# Patient Record
Sex: MALE | Race: WHITE | NOT HISPANIC OR LATINO | Employment: OTHER | URBAN - METROPOLITAN AREA
[De-identification: names, ages, dates, MRNs, and addresses within clinical notes are randomized per-mention and may not be internally consistent; named-entity substitution may affect disease eponyms.]

---

## 2018-08-08 ENCOUNTER — HOSPITAL ENCOUNTER (EMERGENCY)
Facility: HOSPITAL | Age: 53
Discharge: LEFT BEFORE DISCHARGE | End: 2018-08-08
Attending: EMERGENCY MEDICINE

## 2018-08-08 ENCOUNTER — APPOINTMENT (EMERGENCY)
Dept: RADIOLOGY | Facility: HOSPITAL | Age: 53
End: 2018-08-08

## 2018-08-08 VITALS
HEART RATE: 77 BPM | OXYGEN SATURATION: 97 % | DIASTOLIC BLOOD PRESSURE: 73 MMHG | WEIGHT: 168.21 LBS | TEMPERATURE: 97.7 F | SYSTOLIC BLOOD PRESSURE: 113 MMHG | RESPIRATION RATE: 16 BRPM

## 2018-08-08 DIAGNOSIS — R06.02 SOB (SHORTNESS OF BREATH): Primary | ICD-10-CM

## 2018-08-08 LAB
ALBUMIN SERPL BCP-MCNC: 4 G/DL (ref 3–5.2)
ALP SERPL-CCNC: 57 U/L (ref 43–122)
ALT SERPL W P-5'-P-CCNC: 45 U/L (ref 9–52)
ANION GAP SERPL CALCULATED.3IONS-SCNC: 9 MMOL/L (ref 5–14)
AST SERPL W P-5'-P-CCNC: 30 U/L (ref 17–59)
BACTERIA UR QL AUTO: ABNORMAL /HPF
BASOPHILS # BLD AUTO: 0.1 THOUSANDS/ΜL (ref 0–0.1)
BASOPHILS NFR BLD AUTO: 1 % (ref 0–1)
BILIRUB SERPL-MCNC: 0.5 MG/DL
BILIRUB UR QL STRIP: NEGATIVE
BUN SERPL-MCNC: 17 MG/DL (ref 5–25)
CALCIUM SERPL-MCNC: 9.2 MG/DL (ref 8.4–10.2)
CHLORIDE SERPL-SCNC: 104 MMOL/L (ref 97–108)
CLARITY UR: CLEAR
CO2 SERPL-SCNC: 25 MMOL/L (ref 22–30)
COLOR UR: YELLOW
CREAT SERPL-MCNC: 0.93 MG/DL (ref 0.7–1.5)
D-DIMER: <0.19 MG/L
EOSINOPHIL # BLD AUTO: 0.4 THOUSAND/ΜL (ref 0–0.4)
EOSINOPHIL NFR BLD AUTO: 4 % (ref 0–6)
ERYTHROCYTE [DISTWIDTH] IN BLOOD BY AUTOMATED COUNT: 13.6 %
GFR SERPL CREATININE-BSD FRML MDRD: 92 ML/MIN/1.73SQ M
GLUCOSE SERPL-MCNC: 110 MG/DL (ref 70–99)
GLUCOSE UR STRIP-MCNC: NEGATIVE MG/DL
HCT VFR BLD AUTO: 48.2 % (ref 41–53)
HGB BLD-MCNC: 16.8 G/DL (ref 13.5–17.5)
HGB UR QL STRIP.AUTO: 25
KETONES UR STRIP-MCNC: NEGATIVE MG/DL
LACTATE SERPL-SCNC: 0.8 MMOL/L (ref 0.7–2)
LEUKOCYTE ESTERASE UR QL STRIP: 500
LYMPHOCYTES # BLD AUTO: 2.3 THOUSANDS/ΜL (ref 0.5–4)
LYMPHOCYTES NFR BLD AUTO: 28 % (ref 20–50)
MAGNESIUM SERPL-MCNC: 2.1 MG/DL (ref 1.6–2.3)
MCH RBC QN AUTO: 28.8 PG (ref 26–34)
MCHC RBC AUTO-ENTMCNC: 34.9 G/DL (ref 31–36)
MCV RBC AUTO: 82 FL (ref 80–100)
MONOCYTES # BLD AUTO: 0.9 THOUSAND/ΜL (ref 0.2–0.9)
MONOCYTES NFR BLD AUTO: 11 % (ref 1–10)
MUCOUS THREADS UR QL AUTO: ABNORMAL
NEUTROPHILS # BLD AUTO: 4.6 THOUSANDS/ΜL (ref 1.8–7.8)
NEUTS SEG NFR BLD AUTO: 56 % (ref 45–65)
NITRITE UR QL STRIP: NEGATIVE
NON-SQ EPI CELLS URNS QL MICRO: ABNORMAL /HPF
PH UR STRIP.AUTO: 5 [PH] (ref 4.5–8)
PHOSPHATE SERPL-MCNC: 4.3 MG/DL (ref 2.5–4.8)
PLATELET # BLD AUTO: 176 THOUSANDS/UL (ref 150–450)
PMV BLD AUTO: 9.7 FL (ref 8.9–12.7)
POTASSIUM SERPL-SCNC: 4.4 MMOL/L (ref 3.6–5)
PROT SERPL-MCNC: 7.6 G/DL (ref 5.9–8.4)
PROT UR STRIP-MCNC: NEGATIVE MG/DL
RBC # BLD AUTO: 5.85 MILLION/UL (ref 4.5–5.9)
RBC #/AREA URNS AUTO: ABNORMAL /HPF
SODIUM SERPL-SCNC: 138 MMOL/L (ref 137–147)
SP GR UR STRIP.AUTO: 1.02 (ref 1–1.04)
TROPONIN I SERPL-MCNC: <0.01 NG/ML (ref 0–0.03)
UROBILINOGEN UA: NEGATIVE MG/DL
WBC # BLD AUTO: 8.3 THOUSAND/UL (ref 4.5–11)
WBC #/AREA URNS AUTO: ABNORMAL /HPF

## 2018-08-08 PROCEDURE — 80053 COMPREHEN METABOLIC PANEL: CPT | Performed by: EMERGENCY MEDICINE

## 2018-08-08 PROCEDURE — 71046 X-RAY EXAM CHEST 2 VIEWS: CPT

## 2018-08-08 PROCEDURE — 96360 HYDRATION IV INFUSION INIT: CPT

## 2018-08-08 PROCEDURE — 36415 COLL VENOUS BLD VENIPUNCTURE: CPT | Performed by: EMERGENCY MEDICINE

## 2018-08-08 PROCEDURE — 99285 EMERGENCY DEPT VISIT HI MDM: CPT

## 2018-08-08 PROCEDURE — 83605 ASSAY OF LACTIC ACID: CPT | Performed by: EMERGENCY MEDICINE

## 2018-08-08 PROCEDURE — 84100 ASSAY OF PHOSPHORUS: CPT | Performed by: EMERGENCY MEDICINE

## 2018-08-08 PROCEDURE — 85379 FIBRIN DEGRADATION QUANT: CPT | Performed by: EMERGENCY MEDICINE

## 2018-08-08 PROCEDURE — 93005 ELECTROCARDIOGRAM TRACING: CPT

## 2018-08-08 PROCEDURE — 85025 COMPLETE CBC W/AUTO DIFF WBC: CPT | Performed by: EMERGENCY MEDICINE

## 2018-08-08 PROCEDURE — 81001 URINALYSIS AUTO W/SCOPE: CPT | Performed by: EMERGENCY MEDICINE

## 2018-08-08 PROCEDURE — 84484 ASSAY OF TROPONIN QUANT: CPT | Performed by: EMERGENCY MEDICINE

## 2018-08-08 PROCEDURE — 83735 ASSAY OF MAGNESIUM: CPT | Performed by: EMERGENCY MEDICINE

## 2018-08-08 PROCEDURE — 94640 AIRWAY INHALATION TREATMENT: CPT

## 2018-08-08 RX ORDER — IPRATROPIUM BROMIDE AND ALBUTEROL SULFATE 2.5; .5 MG/3ML; MG/3ML
3 SOLUTION RESPIRATORY (INHALATION) ONCE
Status: COMPLETED | OUTPATIENT
Start: 2018-08-08 | End: 2018-08-08

## 2018-08-08 RX ORDER — IPRATROPIUM BROMIDE AND ALBUTEROL SULFATE 2.5; .5 MG/3ML; MG/3ML
SOLUTION RESPIRATORY (INHALATION)
Status: COMPLETED
Start: 2018-08-08 | End: 2018-08-08

## 2018-08-08 RX ADMIN — SODIUM CHLORIDE 1000 ML: 9 INJECTION, SOLUTION INTRAVENOUS at 18:32

## 2018-08-08 RX ADMIN — IPRATROPIUM BROMIDE AND ALBUTEROL SULFATE 3 ML: 2.5; .5 SOLUTION RESPIRATORY (INHALATION) at 18:44

## 2018-08-08 RX ADMIN — IPRATROPIUM BROMIDE AND ALBUTEROL SULFATE 3 ML: .5; 3 SOLUTION RESPIRATORY (INHALATION) at 18:44

## 2018-08-08 NOTE — ED PROVIDER NOTES
History  Chief Complaint   Patient presents with    Shortness of Breath     pt states that he has felt SOB, had upper back pain, and chest pain when he moves around more than normal  he also has a headache     Patient is a 75-year-old male with a history of hypertension coming in today with progressive shortness of breath and dyspnea on exertion  Patient states he was recently treated with Levaquin by his family doctor for URI  This was approximately 2 weeks ago when his symptoms were productive cough with fevers and chills  Subsequently this has resolved after full 5 days  He states he has a dry cough now  He has no fevers, chills, hemoptysis, lower extremity edema or chest pain or pressure  He does feel more short of breath with walking short distances as well as stairs  He does get a little diaphoretic at times  He has no nauseousness, vomiting  He has no recent travel, recent surgeries  He did not take anything for this  Patient was concerned because I am a smoker and I wanted make sure my lungs are okay        History provided by:  Patient   used: No    Shortness of Breath   Severity:  Moderate  Onset quality:  Gradual  Duration:  2 weeks  Timing:  Constant  Progression:  Unchanged  Chronicity:  New  Context: URI    Context: not activity, not animal exposure, not emotional upset, not fumes, not known allergens, not occupational exposure, not pollens, not smoke exposure, not strong odors and not weather changes    Relieved by:  Nothing  Worsened by:  Exertion, movement and coughing  Ineffective treatments: abx    Associated symptoms: cough    Associated symptoms: no abdominal pain, no chest pain, no claudication, no diaphoresis, no ear pain, no fever, no headaches, no hemoptysis, no neck pain, no PND, no rash, no sore throat, no sputum production, no syncope, no swollen glands, no vomiting and no wheezing    Risk factors: tobacco use    Risk factors: no recent alcohol use, no family hx of DVT, no hx of cancer, no hx of PE/DVT, no obesity, no prolonged immobilization and no recent surgery        None       Past Medical History:   Diagnosis Date    Hypertension        History reviewed  No pertinent surgical history  History reviewed  No pertinent family history  I have reviewed and agree with the history as documented  Social History   Substance Use Topics    Smoking status: Current Every Day Smoker     Types: Cigars    Smokeless tobacco: Never Used      Comment: smokes 2-3 cigars daily    Alcohol use No        Review of Systems   Constitutional: Negative for diaphoresis and fever  HENT: Negative for ear pain and sore throat  Eyes: Negative for visual disturbance  Respiratory: Positive for cough and shortness of breath  Negative for hemoptysis, sputum production, chest tightness and wheezing  +HUGGINS     Cardiovascular: Negative for chest pain, palpitations, claudication, syncope and PND  Gastrointestinal: Negative for abdominal pain, nausea and vomiting  Genitourinary: Negative for difficulty urinating and dysuria  Musculoskeletal: Negative for back pain and neck pain  Skin: Negative for rash  Neurological: Negative for weakness and headaches  Psychiatric/Behavioral: Negative for confusion  All other systems reviewed and are negative  Physical Exam  Physical Exam   Constitutional: He is oriented to person, place, and time  He appears well-developed and well-nourished  No distress  HENT:   Head: Normocephalic and atraumatic  Mouth/Throat: Oropharynx is clear and moist    Patient is maintaining airway maintaining secretions  Uvula midline without any edema  Eyes: Conjunctivae and EOM are normal  Pupils are equal, round, and reactive to light  Neck: Normal range of motion  Neck supple  Cardiovascular: Normal rate, regular rhythm, normal heart sounds and intact distal pulses  No murmur heard    Pulmonary/Chest: Effort normal and breath sounds normal  No stridor  No respiratory distress  He exhibits no tenderness  No conversational dyspnea  Abdominal: Soft  Bowel sounds are normal  He exhibits no distension  There is no tenderness  Musculoskeletal: Normal range of motion  He exhibits no edema  Neurological: He is alert and oriented to person, place, and time  No cranial nerve deficit  Skin: Skin is warm  Capillary refill takes less than 2 seconds  He is not diaphoretic  Psychiatric: He has a normal mood and affect  His behavior is normal  Judgment and thought content normal    Nursing note and vitals reviewed  Vital Signs  ED Triage Vitals   Temperature Pulse Respirations Blood Pressure SpO2   08/08/18 1644 08/08/18 1644 08/08/18 1717 08/08/18 1644 08/08/18 1644   97 7 °F (36 5 °C) 87 18 107/72 93 %      Temp Source Heart Rate Source Patient Position - Orthostatic VS BP Location FiO2 (%)   08/08/18 1644 08/08/18 1841 08/08/18 1644 08/08/18 1644 --   Temporal Monitor Sitting Left arm       Pain Score       08/08/18 1841       No Pain           Vitals:    08/08/18 1644 08/08/18 1841   BP: 107/72 113/73   Pulse: 87 77   Patient Position - Orthostatic VS: Sitting Lying       Visual Acuity      ED Medications  Medications   sodium chloride 0 9 % bolus 1,000 mL (0 mL Intravenous Stopped 8/8/18 1936)   ipratropium-albuterol (DUO-NEB) 0 5-2 5 mg/3 mL inhalation solution 3 mL (3 mL Nebulization Given 8/8/18 1844)       Diagnostic Studies  Results Reviewed     Procedure Component Value Units Date/Time    Lactic acid, plasma [[id number]]  (Normal) Collected:  08/08/18 1832    Lab Status:  Final result Specimen:  Blood from Arm, Right Updated:  08/08/18 1959     LACTIC ACID 0 8 mmol/L     Narrative:         Result may be elevated if tourniquet was used during collection      Troponin I [[id number]]  (Normal) Collected:  08/08/18 1832    Lab Status:  Final result Specimen:  Blood from Arm, Right Updated:  08/08/18 1958     Troponin I <0 01 ng/mL D-Dimer [[id number]]  (Normal) Collected:  08/08/18 1833    Lab Status:  Final result Specimen:  Blood from Arm, Right Updated:  08/08/18 1941     D-DIMER <0 19 mg/L     Narrative:       D-DIMER:      CBC and differential [[id number]]  (Abnormal) Collected:  08/08/18 1832    Lab Status:  Final result Specimen:  Blood from Arm, Right Updated:  08/08/18 1921     WBC 8 30 Thousand/uL      RBC 5 85 Million/uL      Hemoglobin 16 8 g/dL      Hematocrit 48 2 %      MCV 82 fL      MCH 28 8 pg      MCHC 34 9 g/dL      RDW 13 6 %      MPV 9 7 fL      Platelets 015 Thousands/uL      Neutrophils Relative 56 %      Lymphocytes Relative 28 %      Monocytes Relative 11 (H) %      Eosinophils Relative 4 %      Basophils Relative 1 %      Neutrophils Absolute 4 60 Thousands/µL      Lymphocytes Absolute 2 30 Thousands/µL      Monocytes Absolute 0 90 Thousand/µL      Eosinophils Absolute 0 40 Thousand/µL      Basophils Absolute 0 10 Thousands/µL     UA w Reflex to Microscopic [[id number]]  (Abnormal) Collected:  08/08/18 1835    Lab Status:  Final result Specimen:  Urine from Urine, Clean Catch Updated:  08/08/18 1921     Color, UA Yellow     Clarity, UA Clear     Specific Gravity, UA 1 025     pH, UA 5 0     Leukocytes,  0 (A)     Nitrite, UA Negative     Protein, UA Negative mg/dl      Glucose, UA Negative mg/dl      Ketones, UA Negative mg/dl      Bilirubin, UA Negative     Blood, UA 25 0 (A)     UROBILINOGEN UA Negative mg/dL     Comprehensive metabolic panel [[id number]]  (Abnormal) Collected:  08/08/18 1832    Lab Status:  Final result Specimen:  Blood from Arm, Right Updated:  08/08/18 1901     Sodium 138 mmol/L      Potassium 4 4 mmol/L      Chloride 104 mmol/L      CO2 25 mmol/L      Anion Gap 9 mmol/L      BUN 17 mg/dL      Creatinine 0 93 mg/dL      Glucose 110 (H) mg/dL      Calcium 9 2 mg/dL      AST 30 U/L      ALT 45 U/L      Alkaline Phosphatase 57 U/L      Total Protein 7 6 g/dL      Albumin 4 0 g/dL      Total Bilirubin 0 50 mg/dL      eGFR 92 ml/min/1 73sq m     Narrative:       Hemolysis  National Kidney Disease Education Program recommendations are as follows:  GFR calculation is accurate only with a steady state creatinine  Chronic Kidney disease less than 60 ml/min/1 73 sq  meters  Kidney failure less than 15 ml/min/1 73 sq  meters  Phosphorus [[id number]]  (Normal) Collected:  08/08/18 1832    Lab Status:  Final result Specimen:  Blood from Arm, Right Updated:  08/08/18 1901     Phosphorus 4 3 mg/dL     Narrative:       Hemolysis    Magnesium [[id number]]  (Normal) Collected:  08/08/18 1832    Lab Status:  Final result Specimen:  Blood from Arm, Right Updated:  08/08/18 1901     Magnesium 2 1 mg/dL     Narrative:       Hemolysis    Urine Microscopic [[id number]] Collected:  08/08/18 1835    Lab Status: In process Specimen:  Urine from Urine, Clean Catch Updated:  08/08/18 1858                 XR chest 2 views   Final Result by Nataliia Grewal MD (08/08 1948)      No acute cardiopulmonary disease              Workstation performed: [id number]                    Procedures  Procedures       Phone Contacts  ED Phone Contact    ED Course         HEART Risk Score      Most Recent Value   History  0 Filed at: 08/08/2018 2017   ECG  1 Filed at: 08/08/2018 2017   Age  1 Filed at: 08/08/2018 2017   Risk Factors  1 Filed at: 08/08/2018 2017   Troponin  0 Filed at: 08/08/2018 2017   Heart Score Risk Calculator   History  0 Filed at: 08/08/2018 2017   ECG  1 Filed at: 08/08/2018 2017   Age  1 Filed at: 08/08/2018 2017   Risk Factors  1 Filed at: 08/08/2018 2017   Troponin  0 Filed at: 08/08/2018 2017   HEART Score  3 Filed at: 08/08/2018 2017   HEART Score  3 Filed at: 08/08/2018 2017            PERC Rule for PE      Most Recent Value   PERC Rule for PE   Age >=50  1 Filed at: 08/08/2018 1721   HR >=100  0 Filed at: 08/08/2018 1721   O2 Sat on room air < 95%  0 Filed at: 08/08/2018 1721   History of PE or DVT  0 Filed at: 08/08/2018 1721   Recent trauma or surgery  0 Filed at: 08/08/2018 1721   Hemoptysis  0 Filed at: 08/08/2018 1721   Exogenous estrogen  0 Filed at: 08/08/2018 1721   Unilateral leg swelling  0 Filed at: 08/08/2018 1721   PERC Rule for PE Results  1 Filed at: 08/08/2018 1721                Nate' Criteria for PE      Most Recent Value   Wells' Criteria for PE   Clinical signs and symptoms of DVT  0 Filed at: 08/08/2018 1721   PE is primary diagnosis or equally likely  0 Filed at: 08/08/2018 1721   HR >100  0 Filed at: 08/08/2018 1721   Immobilization at least 3 days or Surgery in the previous 4 weeks  0 Filed at: 08/08/2018 1721   Previous, objectively diagnosed PE or DVT  0 Filed at: 08/08/2018 1721   Hemoptysis  0 Filed at: 08/08/2018 1721   Malignancy with treatment within 6 months or palliative  0 Filed at: 08/08/2018 1721   Wells' Criteria Total  0 Filed at: 08/08/2018 1721            MDM  Number of Diagnoses or Management Options  SOB (shortness of breath):   Diagnosis management comments:     Patient is a 27-year-old male nontoxic appearing in no respiratory distress  Given patient's age and symptoms will obtain a EKG, D-dimer, troponin as well as basic labs  EKG INTERPRETATION at 5:17 p m  RHYTHM:  Normal sinus rhythm at 83 beats per minute  AXIS:  Normal axis  INTERVALS:  Measured at 162 milliseconds  QRS COMPLEX:  Measured at 82 milliseconds  ST SEGMENT:  Nonspecific ST segment changes  T-wave flattening in AVF  T-wave inversion in 3 and AVR  QT INTERVAL:  QTC measured at 4 1 milliseconds  COMPARED WITH PRIOR   Darion Forde Interpretation by Kelly Hickman DO    7:48 PM  Patient ambulated throughout the ER with no respiratory distress and no shortness of breath  Sats were 99%  Pending troponin  Labs including a D-dimer negative  7:52 PM  Patient left without me discussing with him regarding return to ER instructions  However RN did discuss this with him  I did fill out [place] paperwork    Patient insisted that he must sleep 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  Pending chest x-ray and troponin  Amount and/or Complexity of Data Reviewed  Clinical lab tests: ordered and reviewed  Tests in the radiology section of CPT®: ordered and reviewed  Tests in the medicine section of CPT®: ordered and reviewed      CritCare Time    Disposition  Final diagnoses:   SOB (shortness of breath)     Time reflects when diagnosis was documented in both MDM as applicable and the Disposition within this note     Time User Action Codes Description Comment    8/8/2018  5:21 PM PlacidoTati macario Prema GO Add [R06 02] SOB (shortness of breath)       ED Disposition     ED Disposition Condition Comment    AMA  Date: 8/8/2018  Patient: James Robertson  Admitted: 8/8/2018  4:47 PM  Attending Provider: Criss Gloria DO    James Robertson or his authorized caregiver has made the decision for the patient to leave the emergency department against the advice of the [facility] department staff  He or his authorized caregiver has been informed and understands the inherent risks, including death, mi, PE, stroke, mini stroke, ACS, many heart attack, paralysis, death, arrhythmias,  He or his authorized caregiver has decide d to accept the responsibility for this decision  James Robertson and all necessary parties have been advised that he may return for further evaluation or treatment  His condition at time of discharge was stable    James Robertson had current vital signs as fol lows:  /73 (BP Location: Left arm)   Pulse 77   Temp 97 7 °F (36 5 °C) (Temporal)   Resp 16   Wt 76 3 kg (168 lb 3 4 oz)         Follow-up Information     Follow up With Specialties Details Why Contact Info      Schedule an appointment as soon as possible for a visit in 1 day      Oseas Lemus  [address]      Oseas Lemus, [address] [facility] [address] [place]   [phone] There are no discharge medications for this patient  No discharge procedures on file      ED Provider  Electronically Signed by           Adamaris Gonzalez DO  08/08/18 2017

## 2018-08-08 NOTE — DISCHARGE INSTRUCTIONS
Shortness of Breath   WHAT YOU NEED TO KNOW:   Shortness of breath is a feeling that you cannot get enough air when you breathe in  You may have this feeling only during activity, or all the time  Your symptoms can range from mild to severe  Shortness of breath may be a sign of a serious health condition that needs immediate care  DISCHARGE INSTRUCTIONS:   Seek care immediately if:   · Your signs and symptoms are the same or worse within 24 hours of treatment  · The skin over your ribs or on your neck sinks in when you breathe  · You feel confused or dizzy  Contact your healthcare provider if:   · You have new or worsening symptoms  · You have questions or concerns about your condition or care  Medicines:   · Medicines  may be used to treat the cause of your symptoms  You may need medicine to treat a bacterial infection or reduce anxiety  Other medicines may be used to open your airway, reduce swelling, or remove extra fluid  If you have a heart condition, you may need medicine to help your heart beat more strongly or regularly  · Take your medicine as directed  Contact your healthcare provider if you think your medicine is not helping or if you have side effects  Tell him or her if you are allergic to any medicine  Keep a list of the medicines, vitamins, and herbs you take  Include the amounts, and when and why you take them  Bring the list or the pill bottles to follow-up visits  Carry your medicine list with you in case of an emergency  Manage shortness of breath:   · Create an action plan  You and your healthcare provider can work together to create a plan for how to handle shortness of breath  The plan can include daily activities, treatment changes, and what to do if you have severe breathing problems  · Lean forward on your elbows when you sit  This helps your lungs expand and may make it easier to breathe  · Use pursed-lip breathing any time you feel short of breath    Breathe in through your nose and then slowly breathe out through your mouth with your lips slightly puckered  It should take you twice as long to breathe out as it did to breathe in  · Do not smoke  Nicotine and other chemicals in cigarettes and cigars can cause lung damage and make shortness of breath worse  Ask your healthcare provider for information if you currently smoke and need help to quit  E-cigarettes or smokeless tobacco still contain nicotine  Talk to your healthcare provider before you use these products  · Reach or maintain a healthy weight  Your healthcare provider can help you create a safe weight loss plan if you are overweight  · Exercise as directed  Exercise can help your lungs work more easily  Exercise can also help you lose weight if needed  Try to get at least 30 minutes of exercise most days of the week  Follow up with your healthcare provider or specialist as directed:  Write down your questions so you remember to ask them during your visits  © 2017 [facility] Information is for End User's use only and may not be sold, redistributed or otherwise used for commercial purposes  All illustrations and images included in CareNotes® are the copyrighted property of A D A M , Inc  or Chad Vargas  The above information is an  only  It is not intended as medical advice for individual conditions or treatments  Talk to your doctor, nurse or pharmacist before following any medical regimen to see if it is safe and effective for you  Shortness of Breath   Chest Foundation: Shortness of breath  [employer]  [address], [address] [facility]  2016  Available from URL: https://foundation  chestnet org/patient-education-resources/shortness-of-breath-2/  As accessed 2017-03-02  Ron PERSAUD: Approach to the Patient with Respiratory Disease  In: [facility], 25th ed  [address], [place], [place], 2016    Stephenkjose LARA Zamarripa DL: Update: shortness of breath  Circulation 2014; 638(29):L021-M054   familydoctor  org: Shortness of breath  American Academy of Family Physicians (AAFP)  Sar[address] Austin  2014  Available from URL: https://familydoctor  org/condition/shortness-of-breath/#overview  As accessed 2017-03-02  Get\[facility]\""[facility] org: Shortness-of-breath  [facility]  [place], [place]  2013  Available from URL: [email or web address]://getpalliativecare  org/shortness-of-breath/  As accessed 2017-03-02  [address]: Minimizing shortness of breath  [address]  [address], [place] [address]  Available from URL: [employer]  As accessed 2017-03-02  [facility]: Shortness of breath and eating  [address]  [address], [place] [address]  Available from URL: [employer]  As accessed 2017-03-02 © 2017 [facility] Information is for End User's use only and may not be sold, redistributed or otherwise used for commercial purposes  All illustrations and images included in CareNotes® are the copyrighted property of [employer]  or Chad Vargas  The above information is an  only  It is not intended as medical advice for individual conditions or treatments  Talk to your doctor, nurse or pharmacist before following any medical regimen to see if it is safe and effective for you

## 2018-08-09 LAB
ATRIAL RATE: 83 BPM
P AXIS: 26 DEGREES
PR INTERVAL: 162 MS
QRS AXIS: 13 DEGREES
QRSD INTERVAL: 82 MS
QT INTERVAL: 342 MS
QTC INTERVAL: 401 MS
T WAVE AXIS: 18 DEGREES
VENTRICULAR RATE: 83 BPM

## 2018-08-09 PROCEDURE — 93010 ELECTROCARDIOGRAM REPORT: CPT | Performed by: INTERNAL MEDICINE

## 2018-08-17 ENCOUNTER — HOSPITAL ENCOUNTER (EMERGENCY)
Facility: HOSPITAL | Age: 53
Discharge: HOME | End: 2018-08-17
Attending: EMERGENCY MEDICINE

## 2018-08-17 VITALS
WEIGHT: 173.06 LBS | TEMPERATURE: 98 F | DIASTOLIC BLOOD PRESSURE: 95 MMHG | RESPIRATION RATE: 17 BRPM | OXYGEN SATURATION: 98 % | SYSTOLIC BLOOD PRESSURE: 134 MMHG | HEART RATE: 87 BPM

## 2018-08-17 DIAGNOSIS — L02.91 ABSCESS: Primary | ICD-10-CM

## 2018-08-17 PROCEDURE — 99282 EMERGENCY DEPT VISIT SF MDM: CPT

## 2018-08-17 RX ORDER — SULFAMETHOXAZOLE AND TRIMETHOPRIM 800; 160 MG/1; MG/1
1 TABLET ORAL ONCE
Status: DISCONTINUED | OUTPATIENT
Start: 2018-08-17 | End: 2018-08-17 | Stop reason: HOSPADM

## 2018-08-17 RX ORDER — CEPHALEXIN 250 MG/1
500 CAPSULE ORAL 4 TIMES DAILY
Qty: 56 CAPSULE | Refills: 0 | Status: SHIPPED | OUTPATIENT
Start: 2018-08-17 | End: 2018-08-17

## 2018-08-17 RX ORDER — SULFAMETHOXAZOLE AND TRIMETHOPRIM 800; 160 MG/1; MG/1
1 TABLET ORAL 2 TIMES DAILY
Qty: 14 TABLET | Refills: 0 | Status: SHIPPED | OUTPATIENT
Start: 2018-08-17 | End: 2018-08-17 | Stop reason: HOSPADM

## 2018-08-17 RX ORDER — CEPHALEXIN 500 MG/1
500 CAPSULE ORAL ONCE
Status: DISCONTINUED | OUTPATIENT
Start: 2018-08-17 | End: 2018-08-17 | Stop reason: HOSPADM

## 2018-08-17 RX ORDER — CEPHALEXIN 500 MG/1
500 CAPSULE ORAL 4 TIMES DAILY
Qty: 28 CAPSULE | Refills: 0 | Status: SHIPPED | OUTPATIENT
Start: 2018-08-17 | End: 2018-08-17 | Stop reason: HOSPADM

## 2018-08-17 NOTE — DISCHARGE INSTRUCTIONS
Abscess   WHAT YOU NEED TO KNOW:   A warm compress may help your abscess drain  Your healthcare provider may make a cut in the abscess so it can drain  You may need surgery to remove an abscess that is on your hands or buttocks  DISCHARGE INSTRUCTIONS:   Return to the emergency department if:   · The area around your abscess becomes very painful, warm, or has red streaks  · You have a fever and chills  · Your heart is beating faster than usual      · You feel faint or confused  Contact your healthcare provider if:   · Your abscess gets bigger or does not get better  · Your abscess returns  · You have questions or concerns about your condition or care  Medicines: You may  need any of the following:  · Antibiotics  help treat a bacterial infection  · Acetaminophen  decreases pain and fever  It is available without a doctor's order  Ask how much to take and how often to take it  Follow directions  Acetaminophen can cause liver damage if not taken correctly  · NSAIDs , such as ibuprofen, help decrease swelling, pain, and fever  This medicine is available with or without a doctor's order  NSAIDs can cause stomach bleeding or kidney problems in certain people  If you take blood thinner medicine, always ask your healthcare provider if NSAIDs are safe for you  Always read the medicine label and follow directions  · Take your medicine as directed  Contact your healthcare provider if you think your medicine is not helping or if you have side effects  Tell him or her if you are allergic to any medicine  Keep a list of the medicines, vitamins, and herbs you take  Include the amounts, and when and why you take them  Bring the list or the pill bottles to follow-up visits  Carry your medicine list with you in case of an emergency  Self-care:   · Apply a warm compress to your abscess  This will help it open and drain  Wet a washcloth in warm, but not hot, water  Apply the compress for 10 minutes  Repeat this 4 times each day  Do not  press on an abscess or try to open it with a needle  You may push the bacteria deeper or into your blood  · Do not share your clothes, towels, or sheets with anyone  This can spread the infection to others  · Wash your hands often  This can help prevent the spread of germs  Use soap and water or an alcohol-based hand rub  Care for your wound after it is drained:   · Care for your wound as directed  If your healthcare provider says it is okay, carefully remove the bandage and gauze packing  You may need to soak the gauze to get it out of your wound  Clean your wound and the area around it as directed  Dry the area and put on new, clean bandages  Change your bandages when they get wet or dirty  · Ask your healthcare provider how to change the gauze in your wound  Keep track of how many pieces of gauze are placed inside the wound  Do not put too much packing in the wound  Do not pack the gauze too tightly in your wound  Follow up with your healthcare provider in 1 to 3 days: You may need to have your packing removed or your bandage changed  Write down your questions so you remember to ask them during your visits  © 2017 [address]  Information is for End User's use only and may not be sold, redistributed or otherwise used for commercial purposes  All illustrations and images included in CareNotes® are the copyrighted property of A D A [employer] , [employer]  or Chad Vargas  The above information is an  only  It is not intended as medical advice for individual conditions or treatments  Talk to your doctor, nurse or pharmacist before following any medical regimen to see if it is safe and effective for you

## 2018-08-17 NOTE — ED PROVIDER NOTES
History  Chief Complaint   Patient presents with    Abscess     "I was here like ten days ago and they did blood work and test and I have a small cyst under my arm"   pt  with large abscess that is currently open on own to left axilla   pt  states "it gave me fever like three or four days ago"     49-year-old gentleman presents with complaint left axillary abscess  He reports that years ago he had a cyst in that area that was removed but was told that it could come back  About a week and half ago he noticed a small lump in that area and since then it has progressed to a large sore area  Several days ago he reports that he was spiking fevers at which time the area opened up began draining a purulent fluid  Since that time he has no longer had any fevers  He has had discomfort with movement of his left arm  He denies any nausea, vomiting, or other issues  None       Past Medical History:   Diagnosis Date    Hypertension        History reviewed  No pertinent surgical history  History reviewed  No pertinent family history  I have reviewed and agree with the history as documented  Social History   Substance Use Topics    Smoking status: Current Every Day Smoker     Types: Cigars    Smokeless tobacco: Never Used      Comment: smokes 2-3 cigars daily    Alcohol use No        Review of Systems   Constitutional: Negative for chills, fatigue and fever (not in the past several days)  HENT: Negative  Eyes: Negative  Respiratory: Negative  Cardiovascular: Negative for chest pain  Gastrointestinal: Negative for abdominal pain, constipation, diarrhea, nausea and vomiting  Endocrine: Negative  Genitourinary: Negative  Musculoskeletal: Negative  Skin: Negative  Negative for rash  Allergic/Immunologic: Negative  Neurological: Negative  Hematological: Negative  Psychiatric/Behavioral: Negative          Physical Exam  Physical Exam   Constitutional: He is oriented to person, place, and time  He appears well-developed and well-nourished  HENT:   Head: Normocephalic and atraumatic  Pulmonary/Chest: No respiratory distress  Musculoskeletal: He exhibits tenderness (left axilla)  Neurological: He is alert and oriented to person, place, and time  Skin: Skin is warm  No rash noted  No erythema  Nursing note and vitals reviewed  Vital Signs  ED Triage Vitals [08/17/18 1853]   Temperature Pulse Respirations Blood Pressure SpO2   98 °F (36 7 °C) 87 17 134/95 98 %      Temp Source Heart Rate Source Patient Position - Orthostatic VS BP Location FiO2 (%)   Temporal Monitor Sitting Right arm --      Pain Score       --           Vitals:    08/17/18 1853   BP: 134/95   Pulse: 87   Patient Position - Orthostatic VS: Sitting       Visual Acuity      ED Medications  Medications - No data to display    Diagnostic Studies  Results Reviewed     None                 No orders to display              Procedures  Incision/Drainage  Date/Time: 8/17/2018 7:38 PM  Performed by: Leonides Klinefelter  Authorized by: Leonides Klinefelter     Patient location:  ED  Consent:     Consent obtained:  Verbal    Consent given by:  Patient    Risks discussed:  Incomplete drainage and pain    Alternatives discussed:  No treatment  Location:     Type:  Abscess    Location:  Upper extremity  Anesthesia (see MAR for exact dosages): Anesthesia method:  None  Procedure details:     Complexity:  Simple    Needle aspiration: no      Incision types: Other (comment) (already open/draining)    Wound management:  Probed and deloculated    Drainage:  Purulent    Drainage amount:  Copious    Wound treatment:  Wound left open  Post-procedure details:     Patient tolerance of procedure:   Tolerated well, no immediate complications  Comments:      Patient reports that he immediately has improved range of motion and less pain his shoulder and axilla after removal of purulent material            Phone Contacts  ED Phone Contact    ED Course                               MDM  CritCare Time    Disposition  Final diagnoses:   Abscess     Time reflects when diagnosis was documented in both MDM as applicable and the Disposition within this note     Time User Action Codes Description Comment    8/17/2018  7:33 PM Jh Coyneard Add [L02 91] Abscess       ED Disposition     ED Disposition Condition Comment    Discharge  Moraima Hadid discharge to home/self care  Condition at discharge: Good        Follow-up Information     Follow up With Specialties Details Why Contact Info Additional Information    [address] [address] [phone] [facility], [address] [place] [id number]          Patient's Medications   Discharge Prescriptions    CEPHALEXIN (KEFLEX) 250 MG CAPSULE    Take 2 capsules (500 mg total) by mouth 4 (four) times a day for 7 days       Start Date: 8/17/2018 End Date: 8/24/2018       Order Dose: 500 mg       Quantity: 56 capsule    Refills: 0    SULFAMETHOXAZOLE-TRIMETHOPRIM (BACTRIM DS) 800-160 MG PER TABLET    Take 1 tablet by mouth 2 (two) times a day for 7 days smx-tmp DS (BACTRIM) 800-160 mg tabs (1tab q12 D10)       Start Date: 8/17/2018 End Date: 8/24/2018       Order Dose: 1 tablet       Quantity: 14 tablet    Refills: 0       Outpatient Discharge Orders  Ambulatory referral to Wound Care   Standing Status: Future  Standing Exp   Date: 02/17/19         ED Provider  Electronically Signed by           Hudson Weathers DO  08/17/18 0347

## 2022-10-17 PROCEDURE — U0005 INFEC AGEN DETEC AMPLI PROBE: HCPCS | Performed by: FAMILY MEDICINE

## 2022-10-17 PROCEDURE — U0003 INFECTIOUS AGENT DETECTION BY NUCLEIC ACID (DNA OR RNA); SEVERE ACUTE RESPIRATORY SYNDROME CORONAVIRUS 2 (SARS-COV-2) (CORONAVIRUS DISEASE [COVID-19]), AMPLIFIED PROBE TECHNIQUE, MAKING USE OF HIGH THROUGHPUT TECHNOLOGIES AS DESCRIBED BY CMS-2020-01-R: HCPCS | Performed by: FAMILY MEDICINE

## 2025-01-07 ENCOUNTER — APPOINTMENT (EMERGENCY)
Dept: RADIOLOGY | Facility: HOSPITAL | Age: 60
End: 2025-01-07

## 2025-01-07 ENCOUNTER — HOSPITAL ENCOUNTER (EMERGENCY)
Facility: HOSPITAL | Age: 60
Discharge: HOME | End: 2025-01-07
Attending: EMERGENCY MEDICINE

## 2025-01-07 VITALS
HEART RATE: 76 BPM | SYSTOLIC BLOOD PRESSURE: 135 MMHG | TEMPERATURE: 98.8 F | WEIGHT: 181.2 LBS | RESPIRATION RATE: 18 BRPM | DIASTOLIC BLOOD PRESSURE: 78 MMHG | OXYGEN SATURATION: 98 %

## 2025-01-07 DIAGNOSIS — R06.09 DOE (DYSPNEA ON EXERTION): Primary | ICD-10-CM

## 2025-01-07 DIAGNOSIS — R05.9 COUGH: ICD-10-CM

## 2025-01-07 LAB
ALBUMIN SERPL BCG-MCNC: 4 G/DL (ref 3.5–5)
ALP SERPL-CCNC: 53 U/L (ref 34–104)
ALT SERPL W P-5'-P-CCNC: 29 U/L (ref 7–52)
ANION GAP SERPL CALCULATED.3IONS-SCNC: 7 MMOL/L (ref 4–13)
APTT PPP: 29 SECONDS (ref 23–34)
AST SERPL W P-5'-P-CCNC: 20 U/L (ref 13–39)
BASOPHILS # BLD AUTO: 0.05 THOUSANDS/ΜL (ref 0–0.1)
BASOPHILS NFR BLD AUTO: 1 % (ref 0–1)
BILIRUB SERPL-MCNC: 0.51 MG/DL (ref 0.2–1)
BNP SERPL-MCNC: 39 PG/ML (ref 0–100)
BUN SERPL-MCNC: 12 MG/DL (ref 5–25)
CALCIUM SERPL-MCNC: 9.3 MG/DL (ref 8.4–10.2)
CARDIAC TROPONIN I PNL SERPL HS: <2 NG/L (ref ?–50)
CHLORIDE SERPL-SCNC: 104 MMOL/L (ref 96–108)
CO2 SERPL-SCNC: 27 MMOL/L (ref 21–32)
CREAT SERPL-MCNC: 1.04 MG/DL (ref 0.6–1.3)
EOSINOPHIL # BLD AUTO: 0.16 THOUSAND/ΜL (ref 0–0.61)
EOSINOPHIL NFR BLD AUTO: 3 % (ref 0–6)
ERYTHROCYTE [DISTWIDTH] IN BLOOD BY AUTOMATED COUNT: 13.2 % (ref 11.6–15.1)
FLUAV AG UPPER RESP QL IA.RAPID: NEGATIVE
FLUBV AG UPPER RESP QL IA.RAPID: NEGATIVE
GFR SERPL CREATININE-BSD FRML MDRD: 77 ML/MIN/1.73SQ M
GLUCOSE SERPL-MCNC: 130 MG/DL (ref 65–140)
HCT VFR BLD AUTO: 48.6 % (ref 36.5–49.3)
HGB BLD-MCNC: 15.5 G/DL (ref 12–17)
IMM GRANULOCYTES # BLD AUTO: 0.04 THOUSAND/UL (ref 0–0.2)
IMM GRANULOCYTES NFR BLD AUTO: 1 % (ref 0–2)
INR PPP: 1.01 (ref 0.85–1.19)
LYMPHOCYTES # BLD AUTO: 1.66 THOUSANDS/ΜL (ref 0.6–4.47)
LYMPHOCYTES NFR BLD AUTO: 26 % (ref 14–44)
MCH RBC QN AUTO: 26.5 PG (ref 26.8–34.3)
MCHC RBC AUTO-ENTMCNC: 31.9 G/DL (ref 31.4–37.4)
MCV RBC AUTO: 83 FL (ref 82–98)
MONOCYTES # BLD AUTO: 0.67 THOUSAND/ΜL (ref 0.17–1.22)
MONOCYTES NFR BLD AUTO: 10 % (ref 4–12)
NEUTROPHILS # BLD AUTO: 3.89 THOUSANDS/ΜL (ref 1.85–7.62)
NEUTS SEG NFR BLD AUTO: 59 % (ref 43–75)
NRBC BLD AUTO-RTO: 0 /100 WBCS
PLATELET # BLD AUTO: 161 THOUSANDS/UL (ref 149–390)
PMV BLD AUTO: 10.9 FL (ref 8.9–12.7)
POTASSIUM SERPL-SCNC: 4 MMOL/L (ref 3.5–5.3)
PROT SERPL-MCNC: 7.1 G/DL (ref 6.4–8.4)
PROTHROMBIN TIME: 13.6 SECONDS (ref 12.3–15)
RBC # BLD AUTO: 5.85 MILLION/UL (ref 3.88–5.62)
SARS-COV+SARS-COV-2 AG RESP QL IA.RAPID: NEGATIVE
SODIUM SERPL-SCNC: 138 MMOL/L (ref 135–147)
WBC # BLD AUTO: 6.47 THOUSAND/UL (ref 4.31–10.16)

## 2025-01-07 PROCEDURE — 99285 EMERGENCY DEPT VISIT HI MDM: CPT | Performed by: PHYSICIAN ASSISTANT

## 2025-01-07 PROCEDURE — 99285 EMERGENCY DEPT VISIT HI MDM: CPT

## 2025-01-07 PROCEDURE — 84484 ASSAY OF TROPONIN QUANT: CPT | Performed by: PHYSICIAN ASSISTANT

## 2025-01-07 PROCEDURE — 85025 COMPLETE CBC W/AUTO DIFF WBC: CPT | Performed by: PHYSICIAN ASSISTANT

## 2025-01-07 PROCEDURE — 36415 COLL VENOUS BLD VENIPUNCTURE: CPT | Performed by: PHYSICIAN ASSISTANT

## 2025-01-07 PROCEDURE — 85730 THROMBOPLASTIN TIME PARTIAL: CPT | Performed by: PHYSICIAN ASSISTANT

## 2025-01-07 PROCEDURE — 80053 COMPREHEN METABOLIC PANEL: CPT | Performed by: PHYSICIAN ASSISTANT

## 2025-01-07 PROCEDURE — 85610 PROTHROMBIN TIME: CPT | Performed by: PHYSICIAN ASSISTANT

## 2025-01-07 PROCEDURE — 83880 ASSAY OF NATRIURETIC PEPTIDE: CPT | Performed by: PHYSICIAN ASSISTANT

## 2025-01-07 PROCEDURE — 87811 SARS-COV-2 COVID19 W/OPTIC: CPT | Performed by: PHYSICIAN ASSISTANT

## 2025-01-07 PROCEDURE — 87804 INFLUENZA ASSAY W/OPTIC: CPT | Performed by: PHYSICIAN ASSISTANT

## 2025-01-07 PROCEDURE — 71046 X-RAY EXAM CHEST 2 VIEWS: CPT

## 2025-01-07 RX ORDER — BENZONATATE 100 MG/1
100 CAPSULE ORAL EVERY 8 HOURS
Qty: 21 CAPSULE | Refills: 0 | Status: SHIPPED | OUTPATIENT
Start: 2025-01-07

## 2025-01-07 NOTE — ED PROVIDER NOTES
Time reflects when diagnosis was documented in both MDM as applicable and the Disposition within this note       Time User Action Codes Description Comment    1/7/2025 12:33 PM Vikki Mendoza Add [R06.09] HUGGINS (dyspnea on exertion)     1/7/2025 12:33 PM Vikki Mendoza Add [R05.9] Cough           ED Disposition       ED Disposition   Discharge    Condition   Stable    Date/Time   Tue Jan 7, 2025 12:33 PM    Comment   Moraima Hadid discharge to home/self care.                   Assessment & Plan       Medical Decision Making  Differential diagnosis includes but not limited to: Acute coronary syndrome, pneumonia, upper respiratory infection, CHF    Problems Addressed:  Cough: acute illness or injury  HUGGINS (dyspnea on exertion): acute illness or injury    Amount and/or Complexity of Data Reviewed  Labs: ordered. Decision-making details documented in ED Course.  Radiology: ordered and independent interpretation performed. Decision-making details documented in ED Course.    Risk  Prescription drug management.        ED Course as of 01/07/25 1429   Tue Jan 07, 2025   1046 No focal consolidation seen on chest x-ray.   1226 Discussed test results with patient.  Given options for admission versus follow-up with cardiology as an outpatient.  Would prefer to seek outpatient management at this time.       Medications - No data to display    ED Risk Strat Scores   HEART Risk Score      Flowsheet Row Most Recent Value   Heart Score Risk Calculator    History 1 Filed at: 01/07/2025 1146   ECG 0 Filed at: 01/07/2025 1146   Age 1 Filed at: 01/07/2025 1146   Risk Factors 2 Filed at: 01/07/2025 1146   Troponin 0 Filed at: 01/07/2025 1146   HEART Score 4 Filed at: 01/07/2025 1146          HEART Risk Score      Flowsheet Row Most Recent Value   Heart Score Risk Calculator    History 1 Filed at: 01/07/2025 1146   ECG 0 Filed at: 01/07/2025 1146   Age 1 Filed at: 01/07/2025 1146   Risk Factors 2 Filed at: 01/07/2025 1146   Troponin 0 Filed  "at: 01/07/2025 1146   HEART Score 4 Filed at: 01/07/2025 1146                            SBIRT 22yo+      Flowsheet Row Most Recent Value   Initial Alcohol Screen: US AUDIT-C     1. How often do you have a drink containing alcohol? 0 Filed at: 01/07/2025 1021   2. How many drinks containing alcohol do you have on a typical day you are drinking?  0 Filed at: 01/07/2025 1021   3a. Male UNDER 65: How often do you have five or more drinks on one occasion? 0 Filed at: 01/07/2025 1021   3b. FEMALE Any Age, or MALE 65+: How often do you have 4 or more drinks on one occassion? 0 Filed at: 01/07/2025 1021   Audit-C Score 0 Filed at: 01/07/2025 1021   JUAN: How many times in the past year have you...    Used an illegal drug or used a prescription medication for non-medical reasons? Never Filed at: 01/07/2025 1021                            History of Present Illness       Chief Complaint   Patient presents with    Shortness of Breath     Patient reports feeling sob, weak, fatigued with chest tightness. Saw pcp given two different courses of abx, didn't finish second course and reports \"I don't know why I was taking them\".       Past Medical History:   Diagnosis Date    Hypertension       History reviewed. No pertinent surgical history.   History reviewed. No pertinent family history.   Social History     Tobacco Use    Smoking status: Every Day     Types: Cigars    Smokeless tobacco: Never    Tobacco comments:     smokes 2-3 cigars daily   Vaping Use    Vaping status: Never Used   Substance Use Topics    Alcohol use: No    Drug use: No      E-Cigarette/Vaping    E-Cigarette Use Never User       E-Cigarette/Vaping Substances      I have reviewed and agree with the history as documented.     59-year-old male with past medical history for hypertension presents to the emergency department with complaints of shortness of breath.  States that 2 to 3 weeks ago he was sick with a cough and low-grade fevers at home.  Has finished 2 " different courses of amoxicillin for pneumonia as diagnosed on an outpatient basis.  States that over the past several days he has been increasingly short of breath with exertion.  Denies associated chest pain or diaphoresis.  Additionally complains of a persistent and dry cough that keeps him up at night.      History provided by:  Patient   used: No    Shortness of Breath  Severity:  Mild  Onset quality:  Gradual  Timing:  Intermittent  Progression:  Waxing and waning  Chronicity:  New  Relieved by:  Rest  Ineffective treatments:  None tried  Associated symptoms: cough    Associated symptoms: no abdominal pain, no chest pain, no claudication, no diaphoresis, no ear pain, no fever, no headaches, no hemoptysis, no neck pain, no PND, no rash, no sore throat, no sputum production, no syncope, no swollen glands, no vomiting and no wheezing        Review of Systems   Constitutional:  Negative for activity change, appetite change, chills, diaphoresis and fever.   HENT:  Negative for congestion, dental problem, drooling, ear discharge, ear pain, mouth sores, nosebleeds, rhinorrhea, sore throat and trouble swallowing.    Eyes:  Negative for pain, discharge and itching.   Respiratory:  Positive for cough and shortness of breath. Negative for hemoptysis, sputum production, chest tightness and wheezing.    Cardiovascular:  Negative for chest pain, palpitations, claudication, syncope and PND.   Gastrointestinal:  Negative for abdominal pain, blood in stool, constipation, diarrhea, nausea and vomiting.   Endocrine: Negative for cold intolerance and heat intolerance.   Genitourinary:  Negative for difficulty urinating, dysuria, flank pain, frequency and urgency.   Musculoskeletal:  Negative for neck pain.   Skin:  Negative for rash and wound.   Allergic/Immunologic: Negative for food allergies and immunocompromised state.   Neurological:  Negative for dizziness, seizures, syncope, weakness, numbness and  headaches.   Psychiatric/Behavioral:  Negative for agitation, behavioral problems and confusion.            Objective       ED Triage Vitals [01/07/25 1012]   Temperature Pulse Blood Pressure Respirations SpO2 Patient Position - Orthostatic VS   98.8 °F (37.1 °C) 76 142/85 16 95 % Sitting      Temp Source Heart Rate Source BP Location FiO2 (%) Pain Score    Oral Monitor Left arm -- --      Vitals      Date and Time Temp Pulse SpO2 Resp BP Pain Score FACES Pain Rating User   01/07/25 1226 -- 76 -- 18 135/78 -- -- SN   01/07/25 1158 -- -- 98 % -- -- -- -- SN   01/07/25 1012 98.8 °F (37.1 °C) 76 95 % 16 142/85 -- --             Physical Exam  Vitals and nursing note reviewed.   Constitutional:       General: He is not in acute distress.     Appearance: He is not diaphoretic.   HENT:      Head: Normocephalic and atraumatic.      Right Ear: External ear normal.      Left Ear: External ear normal.      Mouth/Throat:      Pharynx: No oropharyngeal exudate.   Eyes:      Conjunctiva/sclera: Conjunctivae normal.   Neck:      Vascular: No JVD.      Trachea: No tracheal deviation.   Cardiovascular:      Rate and Rhythm: Normal rate and regular rhythm.      Heart sounds: Normal heart sounds. No murmur heard.     No friction rub. No gallop.   Pulmonary:      Effort: No respiratory distress.      Breath sounds: No wheezing or rales.   Chest:      Chest wall: No tenderness.   Abdominal:      General: Bowel sounds are normal. There is no distension.      Palpations: Abdomen is soft.      Tenderness: There is no abdominal tenderness. There is no guarding.   Musculoskeletal:         General: No tenderness or deformity. Normal range of motion.   Lymphadenopathy:      Cervical: No cervical adenopathy.   Skin:     General: Skin is warm and dry.      Findings: No erythema or rash.   Neurological:      General: No focal deficit present.      Mental Status: He is alert and oriented to person, place, and time.   Psychiatric:         Mood  and Affect: Mood normal.         Behavior: Behavior normal.         Results Reviewed       Procedure Component Value Units Date/Time    HS Troponin I 4hr [[id number]]     Lab Status: No result Specimen: Blood     HS Troponin I 2hr [[id number]]     Lab Status: No result Specimen: Blood     HS Troponin 0hr (reflex protocol) [[id number]]  (Normal) Collected: 01/07/25 1054    Lab Status: Final result Specimen: Blood from Arm, Right Updated: 01/07/25 1127     hs TnI 0hr <2 ng/L     B-Type Natriuretic Peptide(BNP) [[id number]]  (Normal) Collected: 01/07/25 1054    Lab Status: Final result Specimen: Blood from Arm, Right Updated: 01/07/25 1125     BNP 39 pg/mL     FLU/COVID Rapid Antigen (30 min. TAT) - Preferred screening test in ED [[id number]]  (Normal) Collected: 01/07/25 1054    Lab Status: Final result Specimen: Nares from Nose Updated: 01/07/25 1120     SARS COV Rapid Antigen Negative     Influenza A Rapid Antigen Negative     Influenza B Rapid Antigen Negative    Narrative:      This test has been performed using the [employer] Cindy 2 FLU+SARS Antigen test under the Emergency Use Authorization (EUA). This test has been validated by the  and verified by the performing laboratory. The Cindy uses lateral flow immunofluorescent sandwich assay to detect SARS-COV, Influenza A and Influenza B Antigen.     The [employer] Cindy 2 SARS Antigen test does not differentiate between SARS-CoV and SARS-CoV-2.     Negative results are presumptive and may be confirmed with a molecular assay, if necessary, for patient management. Negative results do not rule out SARS-CoV-2 or influenza infection and should not be used as the sole basis for treatment or patient management decisions. A negative test result may occur if the level of antigen in a sample is below the limit of detection of this test.     Positive results are indicative of the presence of viral antigens, but do not rule out bacterial infection or co-infection with other  viruses.     All test results should be used as an adjunct to clinical observations and other information available to the provider.    FOR PEDIATRIC PATIENTS - copy/paste COVID Guidelines URL to browser: [email or web address]    Comprehensive metabolic panel [[id number]] Collected: 01/07/25 1054    Lab Status: Final result Specimen: Blood from Arm, Right Updated: 01/07/25 1119     Sodium 138 mmol/L      Potassium 4.0 mmol/L      Chloride 104 mmol/L      CO2 27 mmol/L      ANION GAP 7 mmol/L      BUN 12 mg/dL      Creatinine 1.04 mg/dL      Glucose 130 mg/dL      Calcium 9.3 mg/dL      AST 20 U/L      ALT 29 U/L      Alkaline Phosphatase 53 U/L      Total Protein 7.1 g/dL      Albumin 4.0 g/dL      Total Bilirubin 0.51 mg/dL      eGFR 77 ml/min/1.73sq m     Narrative:      National Kidney Disease Foundation guidelines for Chronic Kidney Disease (CKD):     Stage 1 with normal or high GFR (GFR > 90 mL/min/1.73 square meters)    Stage 2 Mild CKD (GFR = 60-89 mL/min/1.73 square meters)    Stage 3A Moderate CKD (GFR = 45-59 mL/min/1.73 square meters)    Stage 3B Moderate CKD (GFR = 30-44 mL/min/1.73 square meters)    Stage 4 Severe CKD (GFR = 15-29 mL/min/1.73 square meters)    Stage 5 End Stage CKD (GFR <15 mL/min/1.73 square meters)  Note: GFR calculation is accurate only with a steady state creatinine    Protime-INR [[id number]]  (Normal) Collected: 01/07/25 1054    Lab Status: Final result Specimen: Blood from Arm, Right Updated: 01/07/25 1114     Protime 13.6 seconds      INR 1.01    Narrative:      INR Therapeutic Range    Indication                                             INR Range      Atrial Fibrillation                                               2.0-3.0  Hypercoagulable State                                    2.0.2.3  Left Ventricular Asist Device                            2.0-3.0  Mechanical Heart Valve                                  -     Aortic(with afib, MI, embolism, HF, LA enlargement,    and/or coagulopathy)                                     2.0-3.0 (2.5-3.5)     Mitral                                                             2.5-3.5  Prosthetic/Bioprosthetic Heart Valve               2.0-3.0  Venous thromboembolism (VTE: VT, PE        2.0-3.0    APTT [[id number]]  (Normal) Collected: 01/07/25 1054    Lab Status: Final result Specimen: Blood from Arm, Right Updated: 01/07/25 1114     PTT 29 seconds     CBC and differential [[id number]]  (Abnormal) Collected: 01/07/25 1054    Lab Status: Final result Specimen: Blood from Arm, Right Updated: 01/07/25 1102     WBC 6.47 Thousand/uL      RBC 5.85 Million/uL      Hemoglobin 15.5 g/dL      Hematocrit 48.6 %      MCV 83 fL      MCH 26.5 pg      MCHC 31.9 g/dL      RDW 13.2 %      MPV 10.9 fL      Platelets 161 Thousands/uL      nRBC 0 /100 WBCs      Segmented % 59 %      Immature Grans % 1 %      Lymphocytes % 26 %      Monocytes % 10 %      Eosinophils Relative 3 %      Basophils Relative 1 %      Absolute Neutrophils 3.89 Thousands/µL      Absolute Immature Grans 0.04 Thousand/uL      Absolute Lymphocytes 1.66 Thousands/µL      Absolute Monocytes 0.67 Thousand/µL      Eosinophils Absolute 0.16 Thousand/µL      Basophils Absolute 0.05 Thousands/µL             XR chest 2 views   ED Interpretation by Vikki Mendoza PA-C (01/07 1102)   No focal consolidation       Final Interpretation by Juana Cantu MD (01/07 1154)      No acute cardiopulmonary disease.            Workstation performed: [id number]             ECG 12 Lead Documentation Only    Date/Time: 1/7/2025 11:13 AM    Performed by: Vikki Mendoza PA-C  Authorized by: Vikki Mendoza PA-C    Indications / Diagnosis:  SOB  ECG reviewed by me, the ED Provider: yes    Patient location:  ED  Previous ECG:     Previous ECG:  Unavailable  Interpretation:     Interpretation: normal    Rate:     ECG rate:  72    ECG rate assessment: normal     Rhythm:     Rhythm: sinus rhythm    Ectopy:     Ectopy: none    QRS:     QRS axis:  Normal    QRS intervals:  Normal  Conduction:     Conduction: normal    ST segments:     ST segments:  Normal  T waves:     T waves: normal        ED Medication and Procedure Management   Prior to Admission Medications   Prescriptions Last Dose Informant Patient Reported? Taking?   lisinopril (ZESTRIL) 40 mg tablet   No No   Sig: take 1 tablet by mouth once daily      Facility-Administered Medications: None     Discharge Medication List as of 1/7/2025 12:34 PM        START taking these medications    Details   benzonatate (TESSALON PERLES) 100 mg capsule Take 1 capsule (100 mg total) by mouth every 8 (eight) hours, Starting Tue 1/7/2025, Normal           CONTINUE these medications which have NOT CHANGED    Details   lisinopril (ZESTRIL) 40 mg tablet take 1 tablet by mouth once daily, Normal             ED SEPSIS DOCUMENTATION   Time reflects when diagnosis was documented in both MDM as applicable and the Disposition within this note       Time User Action Codes Description Comment    1/7/2025 12:33 PM Vikki Mendoza Add [R06.09] HUGGINS (dyspnea on exertion)     1/7/2025 12:33 PM Vikki Mendoza Add [R05.9] Cough                  Vikki Mendoza PA-C  01/07/25 [id number]

## 2025-01-21 LAB
ATRIAL RATE: 72 BPM
P AXIS: 21 DEGREES
PR INTERVAL: 166 MS
QRS AXIS: 0 DEGREES
QRSD INTERVAL: 84 MS
QT INTERVAL: 364 MS
QTC INTERVAL: 399 MS
T WAVE AXIS: 17 DEGREES
VENTRICULAR RATE: 72 BPM